# Patient Record
Sex: MALE | Race: BLACK OR AFRICAN AMERICAN | NOT HISPANIC OR LATINO | Employment: OTHER | URBAN - METROPOLITAN AREA
[De-identification: names, ages, dates, MRNs, and addresses within clinical notes are randomized per-mention and may not be internally consistent; named-entity substitution may affect disease eponyms.]

---

## 2023-09-02 LAB — PROSTATE SPECIFIC AG (NG/ML) IN SER/PLAS: 0.98 NG/ML (ref 0–4)

## 2023-09-25 PROBLEM — N40.2 PROSTATE NODULE: Status: ACTIVE | Noted: 2023-09-25

## 2023-09-25 PROBLEM — H40.1121 PRIMARY OPEN ANGLE GLAUCOMA OF LEFT EYE, MILD STAGE: Status: ACTIVE | Noted: 2023-09-25

## 2023-09-25 PROBLEM — H40.003 GLAUCOMA SUSPECT OF BOTH EYES: Status: ACTIVE | Noted: 2023-09-25

## 2023-09-25 PROBLEM — R00.2 PALPITATIONS: Status: ACTIVE | Noted: 2023-09-25

## 2023-09-25 PROBLEM — I10 HYPERTENSION: Status: ACTIVE | Noted: 2023-09-25

## 2023-09-25 PROBLEM — R73.01 IMPAIRED FASTING GLUCOSE: Status: ACTIVE | Noted: 2023-09-25

## 2023-09-25 PROBLEM — H25.011 CORTICAL AGE-RELATED CATARACT OF RIGHT EYE: Status: ACTIVE | Noted: 2023-09-25

## 2023-09-25 PROBLEM — H40.1111 PRIMARY OPEN ANGLE GLAUCOMA OF RIGHT EYE, MILD STAGE: Status: ACTIVE | Noted: 2023-09-25

## 2023-09-25 PROBLEM — I45.10 RIGHT BUNDLE BRANCH BLOCK (RBBB): Status: ACTIVE | Noted: 2023-09-25

## 2023-09-25 PROBLEM — N40.1 BPH WITH OBSTRUCTION/LOWER URINARY TRACT SYMPTOMS: Status: ACTIVE | Noted: 2023-09-25

## 2023-09-25 PROBLEM — R35.0 URINARY FREQUENCY: Status: ACTIVE | Noted: 2023-09-25

## 2023-09-25 PROBLEM — H01.003 BLEPHARITIS OF BOTH EYES: Status: ACTIVE | Noted: 2023-09-25

## 2023-09-25 PROBLEM — R07.9 CHEST PAIN: Status: ACTIVE | Noted: 2023-09-25

## 2023-09-25 PROBLEM — H01.006 BLEPHARITIS OF BOTH EYES: Status: ACTIVE | Noted: 2023-09-25

## 2023-09-25 PROBLEM — N13.8 BPH WITH OBSTRUCTION/LOWER URINARY TRACT SYMPTOMS: Status: ACTIVE | Noted: 2023-09-25

## 2023-09-25 PROBLEM — H25.012 CORTICAL AGE-RELATED CATARACT OF LEFT EYE: Status: ACTIVE | Noted: 2023-09-25

## 2023-09-25 PROBLEM — E78.5 DYSLIPIDEMIA: Status: ACTIVE | Noted: 2023-09-25

## 2023-09-25 RX ORDER — HYDROCHLOROTHIAZIDE 12.5 MG/1
1 TABLET ORAL DAILY
COMMUNITY
Start: 2019-03-07 | End: 2024-05-20 | Stop reason: ALTCHOICE

## 2023-09-25 RX ORDER — LOSARTAN POTASSIUM 100 MG/1
1 TABLET ORAL DAILY
COMMUNITY
Start: 2021-07-30

## 2023-09-25 RX ORDER — HYDROGEN PEROXIDE 3 %
1 SOLUTION, NON-ORAL MISCELLANEOUS DAILY PRN
COMMUNITY
Start: 2018-04-06

## 2023-09-25 RX ORDER — METOPROLOL TARTRATE 25 MG/1
1 TABLET, FILM COATED ORAL 2 TIMES DAILY
COMMUNITY
Start: 2015-01-26 | End: 2024-05-20 | Stop reason: ALTCHOICE

## 2023-09-25 RX ORDER — VIT C/E/ZN/COPPR/LUTEIN/ZEAXAN 250MG-90MG
1 CAPSULE ORAL DAILY
COMMUNITY
Start: 2018-04-06

## 2023-09-25 RX ORDER — NITROGLYCERIN 0.4 MG/1
1 TABLET SUBLINGUAL AS NEEDED
COMMUNITY
Start: 2019-10-16

## 2023-09-25 RX ORDER — NAPROXEN SODIUM 220 MG/1
1 TABLET, FILM COATED ORAL DAILY
COMMUNITY
End: 2024-05-20 | Stop reason: ALTCHOICE

## 2023-09-25 RX ORDER — FLAXSEED OIL 1000 MG
1 CAPSULE ORAL DAILY
COMMUNITY
Start: 2021-08-17

## 2023-09-25 RX ORDER — TAMSULOSIN HYDROCHLORIDE 0.4 MG/1
CAPSULE ORAL SEE ADMIN INSTRUCTIONS
COMMUNITY
Start: 2015-01-26

## 2023-09-25 RX ORDER — ATORVASTATIN CALCIUM 40 MG/1
1 TABLET, FILM COATED ORAL DAILY
COMMUNITY
Start: 2018-04-06

## 2023-11-07 ENCOUNTER — APPOINTMENT (OUTPATIENT)
Dept: OPHTHALMOLOGY | Facility: CLINIC | Age: 83
End: 2023-11-07
Payer: MEDICARE

## 2024-01-23 ENCOUNTER — OFFICE VISIT (OUTPATIENT)
Dept: OPHTHALMOLOGY | Facility: CLINIC | Age: 84
End: 2024-01-23
Payer: MEDICARE

## 2024-01-23 DIAGNOSIS — H53.40 UNSPECIFIED VISUAL FIELD DEFECTS: ICD-10-CM

## 2024-01-23 DIAGNOSIS — H40.009 GLAUCOMA SUSPECT, UNSPECIFIED LATERALITY: Primary | ICD-10-CM

## 2024-01-23 PROCEDURE — 92133 CPTRZD OPH DX IMG PST SGM ON: CPT | Performed by: OPHTHALMOLOGY

## 2024-01-23 PROCEDURE — 99212 OFFICE O/P EST SF 10 MIN: CPT | Performed by: OPHTHALMOLOGY

## 2024-01-23 RX ORDER — CALCIUM CARBONATE 200(500)MG
500 TABLET,CHEWABLE ORAL
COMMUNITY
Start: 2023-10-23

## 2024-01-23 RX ORDER — AMLODIPINE BESYLATE 5 MG/1
5 TABLET ORAL DAILY
COMMUNITY

## 2024-01-23 RX ORDER — METOPROLOL SUCCINATE 50 MG/1
TABLET, EXTENDED RELEASE ORAL DAILY
COMMUNITY
Start: 2024-01-09

## 2024-01-23 RX ORDER — MULTIVITAMIN
1 TABLET ORAL
COMMUNITY
Start: 2017-10-03

## 2024-01-23 ASSESSMENT — CONF VISUAL FIELD
OS_NORMAL: 1
OD_SUPERIOR_NASAL_RESTRICTION: 0
OD_SUPERIOR_TEMPORAL_RESTRICTION: 0
OS_INFERIOR_TEMPORAL_RESTRICTION: 0
OS_SUPERIOR_TEMPORAL_RESTRICTION: 0
OS_INFERIOR_NASAL_RESTRICTION: 0
OS_SUPERIOR_NASAL_RESTRICTION: 0
OD_NORMAL: 1
OD_INFERIOR_NASAL_RESTRICTION: 0
OD_INFERIOR_TEMPORAL_RESTRICTION: 0

## 2024-01-23 ASSESSMENT — EXTERNAL EXAM - LEFT EYE: OS_EXAM: NORMAL

## 2024-01-23 ASSESSMENT — VISUAL ACUITY
OS_CC: 20/20
METHOD: SNELLEN - LINEAR
OD_CC: 20/20-1
CORRECTION_TYPE: GLASSES

## 2024-01-23 ASSESSMENT — PACHYMETRY
OS_CT(UM): 535
OD_CT(UM): 540

## 2024-01-23 ASSESSMENT — SLIT LAMP EXAM - LIDS
COMMENTS: GOOD POSITION
COMMENTS: GOOD POSITION

## 2024-01-23 ASSESSMENT — TONOMETRY
OD_IOP_MMHG: 18
IOP_METHOD: GOLDMANN APPLANATION
OS_IOP_MMHG: 16

## 2024-01-23 ASSESSMENT — CUP TO DISC RATIO
OS_RATIO: .65
OD_RATIO: .45

## 2024-01-23 ASSESSMENT — EXTERNAL EXAM - RIGHT EYE: OD_EXAM: NORMAL

## 2024-05-17 PROBLEM — E21.0 PRIMARY HYPERPARATHYROIDISM (MULTI): Status: ACTIVE | Noted: 2023-05-16

## 2024-05-17 PROBLEM — I10 ESSENTIAL HYPERTENSION: Status: ACTIVE | Noted: 2017-10-03

## 2024-05-17 PROBLEM — I25.10 CORONARY ARTERY DISEASE INVOLVING NATIVE CORONARY ARTERY OF NATIVE HEART WITHOUT ANGINA PECTORIS: Status: ACTIVE | Noted: 2019-08-20

## 2024-05-17 PROBLEM — H25.10 NUCLEAR SENILE CATARACT: Status: ACTIVE | Noted: 2024-05-17

## 2024-05-19 NOTE — PROGRESS NOTES
Primary Care Physician: Lissa Lincoln MD  Date of Visit: 05/20/2024  8:20 AM EDT  [place] [facility] [place] [address]   Last office visit: December 23, 2021    Chief Complaint:     Palpitations    HPI/Summary  Vincent Tracey is a 83 y.o. male who presents for followup cardiology evaluation.     The last visit was 2-1/2 years ago.  He presents with hypertension and dyslipidemia.  In 2019, an echocardiogram was normal.  He reported palpitations in 2019, and we placed a Zio patch monitor which showed no arrhythmia.  Previous cardiac testing showed no evidence for significant coronary artery disease.  A stress test on December 30, 2019, which was a pharmacologic stress myocardial perfusion scan, showed diaphragmatic attenuation artifact.  The ejection fraction was 61%.  At his last visit, he reported an episode of probable PAT, lasting for 15 to 20 minutes.  He described the sudden onset of a rapid and regular tachycardia.  No other diagnostic testing was performed at that time.  The patient is now followed by physicians at [facility].  In 2023, he underwent parathyroidectomy.    The patient reports sudden onset of rapid regular tachycardia, associated with lightheadedness if he is standing.  He sits down, the symptoms last for 5 to 15 minutes and resolved spontaneously.  The frequency is less than once monthly.  No associated chest pain or dyspnea.  No clear-cut precipitating factors.  No syncope.  Similar symptoms were reported at his last visit.  Otherwise, he feels well.  He walks for exercise, 1 mile in 25 minutes, 6 days weekly.  No exertional dyspnea or angina.    We note prediabetes with a hemoglobin A1c 6.3%.  Renal function normal.  A lipid panel just performed on March 5, 2024 shows a cholesterol 135, HDL 45, LDL 78, triglycerides 59 mg/dL.    The patient is being treated with aspirin 81 mg daily, amlodipine 5 mg daily, atorvastatin 40 mg daily, losartan 100 mg daily and metoprolol succinate 50 mg once daily.  He is  "followed by primary care at [facility].   Specialty Problems          Cardiology Problems    Hyperlipidemia     Last Assessment & Plan:    Formatting of this note might be different from the original.   Assessment:   On lipitor         Essential hypertension     Last Assessment & Plan:    Formatting of this note might be different from the original.   Assessment: BP today 142/69   On Amlodipine and Metoprolol         Coronary artery disease involving native coronary artery of native heart without angina pectoris     Last Assessment & Plan:    Formatting of this note might be different from the original.   Assessment: has followed with Dr. Bueno   No history of stenting or CABG   Had cardiac cath many years ago   No chest pain or SOB   No need to use nitro since seeing Dr. Bueno   Last visit with Dr. Bueno 12/2021 - pt reports he was told no need to follow up unless he has an issue   Aspirin 81 mg is already on hold since Sunday for surgery   On statin   Pt reports has been told only mild CAD in past      Most recent visit with Dr. Bueno 12/23/2021 (found in care everywhere):   \"Impressions      The patient may have had an episode of paroxysmal SVT. He does not have any evidence for underlying coronary artery disease. Atrial fibrillation is a possibility. He has had no recurrent arrhythmia. Nonetheless he has repeated episodes, the likelihood of detecting any significant arrhythmia on a continuous ECG monitor would be quite low. He has symptoms recur, we will place a 30-day event monitor. He is already on a reasonable dose of beta-blocker with a low resting heart rate, and his blood pressures at home are well controlled.       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    Dry eye syndrome of right lacrimal gland 08/11/2015    Dry eye syndrome of right lacrimal gland    Other specified health status     No pertinent past surgical history    Pain in left hip 06/19/2017    Left hip pain    Personal history of other diseases of the circulatory system     History of hypertension    Personal history of other diseases of the respiratory system 06/02/2015    History of sinusitis    Personal history of other infectious and parasitic diseases 06/02/2015    History of tinea corporis    Strain of muscle, fascia and tendon of the posterior muscle group at thigh level, left thigh, initial encounter 06/19/2017    Left hamstring muscle strain, initial encounter          History reviewed. No pertinent surgical history.         Social History     Tobacco Use    Smoking status: Never    Smokeless tobacco: Never        Physical Activity: Insufficiently Active (2/7/2023)    Received from [facility]    Exercise Vital Sign     Days of Exercise per Week: 1 day     Minutes of Exercise per Session: 30 min            Allergies   Allergen Reactions    Ace Inhibitors Cough    Amoxicillin Swelling         Current Outpatient Medications   Medication Instructions    amLODIPine (NORVASC) 5 mg, oral, Daily    atorvastatin (Lipitor) 40 mg tablet 1 tablet, oral, Daily    calcium carbonate (TUMS) 500 mg, oral, Every 1 hour PRN    cetirizine (ZyrTEC) 10 mg capsule 1 capsule, oral, Daily PRN    cholecalciferol (Vitamin D-3) 25 MCG (1000 UT) capsule 1 capsule, oral, Daily    esomeprazole (NexIUM) 20 mg DR capsule 1 capsule, oral, Daily PRN    flaxseed oiL 1,000 mg capsule 1 capsule, oral, Daily    losartan (Cozaar) 100 mg tablet 1 tablet, oral, Daily     "metoprolol succinate XL (Toprol-XL) 50 mg 24 hr tablet oral, Daily    multivitamin tablet 1 tablet, oral, Daily RT    nitroglycerin (Nitrostat) 0.4 mg SL tablet 1 tablet, sublingual, As needed    tamsulosin (Flomax) 0.4 mg 24 hr capsule oral, See admin instructions, TAKE 2 CAPSULE Daily 1/2 hour after same meal or at bedtime with snack       ROS     Vital Signs:  Vitals:    05/20/24 0814   BP: 128/61   BP Location: Left arm   Patient Position: Sitting   BP Cuff Size: Large adult   Pulse: 72   SpO2: 94%   Weight: 92.1 kg (203 lb 2 oz)   Height: 1.753 m (5' 9\")     Wt Readings from Last 2 Encounters:   05/20/24 92.1 kg (203 lb 2 oz)   09/02/22 91.7 kg (202 lb 2 oz)     Body mass index is 30 kg/m².       Physical Exam:    He appeared well.  He was pleasant and cooperative, not in any pain.  The neck veins were not apparent.  There were no bruits.  The lung fields were clear.  Heart sounds regular, no murmurs appreciated.  Mild abdominal obesity.  No edema noted.  Otherwise, examination normal.     Last Labs:  CMP:  Recent Labs     02/14/23  1017 08/23/22  1500 08/15/22  0717 11/03/21  1307 08/21/21  0900    139 138 141 137   K 4.5 4.3 4.4 4.4 4.4    106 104 104 103   CO2 30 27 29 30 27   ANIONGAP 11 10 9* 11 11   BUN 19 18 20 22 20   CREATININE 1.14 1.08 1.17 0.99 1.08   GLUCOSE 114* 107* 110* 97 106*     Recent Labs     02/14/23  1017 08/15/22  0717 11/03/21  1307 08/21/21  0900 01/22/21  0930   ALBUMIN 4.5  --  4.5 4.2 4.6   ALKPHOS 78  --  91 81 98   ALT 22 19 23 20 29   AST 19  --  22 22 22   BILITOT 0.6  --  0.7 0.6 0.5     CBC:  Recent Labs     02/14/23  1017 08/15/22  0717 11/03/21  1307 08/21/21  0900 01/22/21  0930   WBC 4.8 5.2 5.3 5.3 4.4   HGB 12.4* 12.1* 12.2* 12.7* 13.5   HCT 35.0* 34.7* 36.6* 35.2* 38.8*    221 223 215 224   MCV 80 81 80 78* 80     COAG:   Recent Labs     12/04/19  1120   INR 1.0     HEME/ENDO:  Recent Labs     03/05/24  0923 08/30/23  0942 02/14/23  1017 " "08/15/22  0717 08/20/19  0936 03/12/19  0952 02/12/19  0927 01/29/19  0840 08/07/18  0957 02/01/18  0000 02/01/18  0000 10/04/17  0905   FERRITIN  --   --  673*  --   --   --   --   --  390*  --  422* 467*   IRONSAT  --   --  31  --   --   --   --   --   --   --  30 13*   TSH  --   --   --   --   --  0.83 0.75  --   --   --   --   --    HGBA1C 6.3* 5.8* 6.5* 6.1*   < >  --   --    < > 6.1   < > 5.9  --     < > = values in this interval not displayed.      CARDIAC: No results for input(s): \"LDH\", \"CKMB\", \"TROPHS\", \"BNP\" in the last 48674 hours.    No lab exists for component: \"CK\", \"CKMBP\"  Recent Labs     02/14/23  1017 08/15/22  0717 11/03/21  1307   CHOL 158 143 146   LDLF 93 81 79   HDL 51.0 46.2 48.4   TRIG 70 80 92       Last Cardiology Tests:    ECG:    Sinus rhythm with first-degree AV block and right bundle branch block.    Echo:  Echo Results:  No results found for this or any previous visit from the past 3650 days.       Cath:      Stress Test:  Stress Results:  No results found for this or any previous visit from the past 365 days.         Cardiac Imaging:        Assessment/Plan     The patient's symptoms are entirely consistent with paroxysmal SVT.  We discussed the Valsalva maneuver, and we have provided patient education material.  Blood pressures are well-controlled, and his lipids are at goal.  We reviewed his medication regimen.  Overall, the risks of daily aspirin outweigh the benefits in this patient, greater than 80 years old without a prior stent or cardiac event.  We did not initiate any additional medication at today's visit and we do not recommend further testing as yet.  We reviewed prior testing with the patient and with his wife.  We encouraged the patient to continue exercising and to restrict calories to achieve modest weight loss in the setting of prediabetes.  He was enthusiastic about making some lifestyle changes.  He will be monitored by his primary care physician, and can follow-up " in cardiology clinic in 1 year, or as needed.      Orders:  Orders Placed This Encounter   Procedures    ECG 12 lead (Clinic Performed)      Followup Appts:  Future Appointments   Date Time Provider Department Center   7/30/2024 10:30 AM MD Cody [id number] [facility]   9/5/2024  2:00 PM Lennox Ferrer MD [facility]           ____________________________________________________________  Jose Maria Bueno MD    Senior Attending Physician  [facility]  [facility]    [facility] Chair for Cardiovascular Excellence  [employer]

## 2024-05-20 ENCOUNTER — OFFICE VISIT (OUTPATIENT)
Dept: CARDIOLOGY | Facility: CLINIC | Age: 84
End: 2024-05-20
Payer: MEDICARE

## 2024-05-20 VITALS
HEIGHT: 69 IN | OXYGEN SATURATION: 94 % | HEART RATE: 72 BPM | WEIGHT: 203.13 LBS | BODY MASS INDEX: 30.09 KG/M2 | DIASTOLIC BLOOD PRESSURE: 61 MMHG | SYSTOLIC BLOOD PRESSURE: 128 MMHG

## 2024-05-20 DIAGNOSIS — E78.5 DYSLIPIDEMIA: ICD-10-CM

## 2024-05-20 DIAGNOSIS — I45.10 RIGHT BUNDLE BRANCH BLOCK (RBBB): ICD-10-CM

## 2024-05-20 DIAGNOSIS — I10 ESSENTIAL HYPERTENSION: ICD-10-CM

## 2024-05-20 DIAGNOSIS — I47.10 PAROXYSMAL SUPRAVENTRICULAR TACHYCARDIA (CMS-HCC): Primary | ICD-10-CM

## 2024-05-20 PROCEDURE — 1126F AMNT PAIN NOTED NONE PRSNT: CPT | Performed by: INTERNAL MEDICINE

## 2024-05-20 PROCEDURE — 3074F SYST BP LT 130 MM HG: CPT | Performed by: INTERNAL MEDICINE

## 2024-05-20 PROCEDURE — 93005 ELECTROCARDIOGRAM TRACING: CPT | Performed by: INTERNAL MEDICINE

## 2024-05-20 PROCEDURE — G2211 COMPLEX E/M VISIT ADD ON: HCPCS | Performed by: INTERNAL MEDICINE

## 2024-05-20 PROCEDURE — 99214 OFFICE O/P EST MOD 30 MIN: CPT | Performed by: INTERNAL MEDICINE

## 2024-05-20 PROCEDURE — 3078F DIAST BP <80 MM HG: CPT | Performed by: INTERNAL MEDICINE

## 2024-05-20 PROCEDURE — 1159F MED LIST DOCD IN RCRD: CPT | Performed by: INTERNAL MEDICINE

## 2024-05-20 PROCEDURE — 1036F TOBACCO NON-USER: CPT | Performed by: INTERNAL MEDICINE

## 2024-05-20 PROCEDURE — 1160F RVW MEDS BY RX/DR IN RCRD: CPT | Performed by: INTERNAL MEDICINE

## 2024-05-20 PROCEDURE — 93010 ELECTROCARDIOGRAM REPORT: CPT | Performed by: INTERNAL MEDICINE

## 2024-05-20 ASSESSMENT — COLUMBIA-SUICIDE SEVERITY RATING SCALE - C-SSRS
1. IN THE PAST MONTH, HAVE YOU WISHED YOU WERE DEAD OR WISHED YOU COULD GO TO SLEEP AND NOT WAKE UP?: NO
2. HAVE YOU ACTUALLY HAD ANY THOUGHTS OF KILLING YOURSELF?: NO
6. HAVE YOU EVER DONE ANYTHING, STARTED TO DO ANYTHING, OR PREPARED TO DO ANYTHING TO END YOUR LIFE?: NO

## 2024-05-20 ASSESSMENT — ENCOUNTER SYMPTOMS
DEPRESSION: 0
LOSS OF SENSATION IN FEET: 0
OCCASIONAL FEELINGS OF UNSTEADINESS: 0

## 2024-05-20 ASSESSMENT — PATIENT HEALTH QUESTIONNAIRE - PHQ9
1. LITTLE INTEREST OR PLEASURE IN DOING THINGS: NOT AT ALL
SUM OF ALL RESPONSES TO PHQ9 QUESTIONS 1 AND 2: 0
2. FEELING DOWN, DEPRESSED OR HOPELESS: NOT AT ALL

## 2024-05-20 ASSESSMENT — PAIN SCALES - GENERAL: PAINLEVEL: 0-NO PAIN

## 2024-05-20 NOTE — PATIENT INSTRUCTIONS
You are doing well.  We have provided material on supraventricular tachycardia, for your study.  We encourage you to exercise for at least 30 minutes 6 days weekly and try to restrict carbohydrates to achieve modest weight loss.  You do have prediabetes.  No need to continue daily aspirin, since we think that the overall risks outweigh the benefits in your age group.  You can send us a [employer] message with any questions or concerns.  Otherwise, we would be pleased to see you in 1 year, or as questions arise.

## 2024-06-05 LAB
ATRIAL RATE: 73 BPM
P AXIS: 76 DEGREES
P OFFSET: 164 MS
P ONSET: 108 MS
PR INTERVAL: 218 MS
Q ONSET: 217 MS
QRS COUNT: 12 BEATS
QRS DURATION: 142 MS
QT INTERVAL: 404 MS
QTC CALCULATION(BAZETT): 445 MS
QTC FREDERICIA: 431 MS
R AXIS: 47 DEGREES
T AXIS: 66 DEGREES
T OFFSET: 419 MS
VENTRICULAR RATE: 73 BPM

## 2024-07-30 ENCOUNTER — APPOINTMENT (OUTPATIENT)
Dept: OPHTHALMOLOGY | Facility: CLINIC | Age: 84
End: 2024-07-30
Payer: MEDICARE

## 2024-07-30 DIAGNOSIS — H40.10X1 OPEN-ANGLE GLAUCOMA OF BOTH EYES, MILD STAGE, UNSPECIFIED OPEN-ANGLE GLAUCOMA TYPE: Primary | ICD-10-CM

## 2024-07-30 PROCEDURE — 99214 OFFICE O/P EST MOD 30 MIN: CPT | Performed by: OPHTHALMOLOGY

## 2024-07-30 ASSESSMENT — TONOMETRY
OD_IOP_MMHG: 18
OS_IOP_MMHG: 16
IOP_METHOD: GOLDMANN APPLANATION

## 2024-07-30 ASSESSMENT — VISUAL ACUITY
METHOD: SNELLEN - LINEAR
CORRECTION_TYPE: GLASSES
OD_CC: 20/25+1
OS_CC: 20/25+1

## 2024-07-30 ASSESSMENT — SLIT LAMP EXAM - LIDS
COMMENTS: GOOD POSITION
COMMENTS: GOOD POSITION

## 2024-07-30 ASSESSMENT — EXTERNAL EXAM - RIGHT EYE: OD_EXAM: NORMAL

## 2024-07-30 ASSESSMENT — PACHYMETRY
OD_CT(UM): 540
OS_CT(UM): 535

## 2024-07-30 ASSESSMENT — CUP TO DISC RATIO
OS_RATIO: .65
OD_RATIO: .45

## 2024-07-30 ASSESSMENT — EXTERNAL EXAM - LEFT EYE: OS_EXAM: NORMAL

## 2024-09-03 NOTE — PROGRESS NOTES
[facility]    Last visit - 9/7/23     HISTORY OF PRESENT ILLNESS:   Vincent Tracey is a 84 y.o. male who is being seen today for follow up and to review PSA results.    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    Dry eye syndrome of left lacrimal gland    Dry eye syndrome of right lacrimal gland 08/11/2015    Dry eye syndrome of right lacrimal gland    Glaucoma     Other specified health status     No pertinent past surgical history    Pain in left hip 06/19/2017    Left hip pain    Personal history of other diseases of the circulatory system     History of hypertension    Personal history of other diseases of the respiratory system 06/02/2015    History of sinusitis    Personal history of other infectious and parasitic diseases 06/02/2015    History of tinea corporis    Strain of muscle, fascia and tendon of the posterior muscle group at thigh level, left thigh, initial encounter 06/19/2017    Left hamstring muscle strain, initial encounter   - BPH w/ LUTS, on Tamsulosin 0.8 mg, prostate nodule, and PI-RADS 2 on MRI 05/02/19 with negative prostate biopsy on 01/16/2020.     PAST SURGICAL HISTORY:  No past surgical history on file.     ALLERGIES:   Allergies   Allergen Reactions    Ace Inhibitors Cough    Amoxicillin Swelling        MEDICATIONS:   Current Outpatient Medications   Medication Instructions    amLODIPine (NORVASC) 5 mg, oral, Daily    atorvastatin (Lipitor) 40 mg tablet 1 tablet, oral, Daily    calcium carbonate (TUMS) 500 mg, oral, Every 1 hour PRN    cetirizine (ZyrTEC) 10 mg capsule 1 capsule, oral, Daily PRN    cholecalciferol (Vitamin D-3) 25 MCG (1000 UT) capsule 1 capsule, oral, Daily    esomeprazole (NexIUM) 20 mg DR capsule 1 capsule, oral, Daily PRN    flaxseed oiL 1,000 mg capsule 1 capsule,  "oral, Daily    losartan (Cozaar) 100 mg tablet 1 tablet, oral, Daily    metoprolol succinate XL (Toprol-XL) 50 mg 24 hr tablet oral, Daily    multivitamin tablet 1 tablet, oral, Daily RT    nitroglycerin (Nitrostat) 0.4 mg SL tablet 1 tablet, sublingual, As needed    tamsulosin (Flomax) 0.4 mg 24 hr capsule oral, See admin instructions, TAKE 2 CAPSULE Daily 1/2 hour after same meal or at bedtime with snack        PHYSICAL EXAM:  There were no vitals taken for this visit.  Constitutional: Patient appears well-developed and well-nourished. No distress.    Pulmonary/Chest: Effort normal. No respiratory distress.   Abdominal: Soft, ND NT  : WNL  Musculoskeletal: Normal range of motion.    Neurological: Alert and oriented to person, place, and time.  Psychiatric: Normal mood and affect. Behavior is normal. Thought content normal.      Labs:  No results found for: \"TESTOSTERONE\"  Lab Results   Component Value Date    PSA 0.98 09/02/2023     No components found for: \"CBC\"  Lab Results   Component Value Date    CREATININE 1.14 02/14/2023     No components found for: \"TESTOTMS\"  No results found for: \"TESTF\"    Discussion:  Doing well, no complaints. Denies any dysuria, hematuria, bothersome urinary frequency, urgency, or flank pain. Patient denies any pushing or straining to urinate. He has some NTF but not bothersome. Will continue to monitor.    PVR 66 cc    Assessment:      1. BPH with obstruction/lower urinary tract symptoms  Measure post void residual          Plan:   Follow up in 1 year or sooner as needed.  All questions and concerns were addressed. Patient verbalizes understanding and has no other questions at this time.      Scribe Attestation  By signing my name below, I, Elaina Calderon   attest that this documentation has been prepared under the direction and in the presence of Lennox Ferrer MD.   "

## 2024-09-05 ENCOUNTER — OFFICE VISIT (OUTPATIENT)
Dept: UROLOGY | Facility: HOSPITAL | Age: 84
End: 2024-09-05
Payer: MEDICARE

## 2024-09-05 DIAGNOSIS — N40.1 BPH WITH OBSTRUCTION/LOWER URINARY TRACT SYMPTOMS: Primary | ICD-10-CM

## 2024-09-05 DIAGNOSIS — N13.8 BPH WITH OBSTRUCTION/LOWER URINARY TRACT SYMPTOMS: Primary | ICD-10-CM

## 2024-09-05 PROCEDURE — 51798 US URINE CAPACITY MEASURE: CPT | Performed by: UROLOGY

## 2024-09-05 PROCEDURE — 99213 OFFICE O/P EST LOW 20 MIN: CPT | Performed by: UROLOGY

## 2024-09-05 PROCEDURE — G2211 COMPLEX E/M VISIT ADD ON: HCPCS | Performed by: UROLOGY

## 2024-09-06 ENCOUNTER — APPOINTMENT (OUTPATIENT)
Dept: UROLOGY | Facility: HOSPITAL | Age: 84
End: 2024-09-06
Payer: MEDICARE

## 2024-12-11 ENCOUNTER — TELEPHONE (OUTPATIENT)
Dept: SCHEDULING | Age: 84
End: 2024-12-11
Payer: MEDICARE

## 2024-12-20 ENCOUNTER — OFFICE VISIT (OUTPATIENT)
Dept: CARDIOLOGY | Facility: CLINIC | Age: 84
End: 2024-12-20
Payer: MEDICARE

## 2024-12-20 VITALS
HEART RATE: 70 BPM | SYSTOLIC BLOOD PRESSURE: 162 MMHG | HEIGHT: 69 IN | OXYGEN SATURATION: 95 % | WEIGHT: 195.1 LBS | BODY MASS INDEX: 28.9 KG/M2 | DIASTOLIC BLOOD PRESSURE: 81 MMHG

## 2024-12-20 DIAGNOSIS — I47.10 SVT (SUPRAVENTRICULAR TACHYCARDIA) (CMS-HCC): Primary | ICD-10-CM

## 2024-12-20 DIAGNOSIS — I10 ESSENTIAL HYPERTENSION: ICD-10-CM

## 2024-12-20 DIAGNOSIS — I45.10 RIGHT BUNDLE BRANCH BLOCK (RBBB): ICD-10-CM

## 2024-12-20 PROCEDURE — 1036F TOBACCO NON-USER: CPT | Performed by: INTERNAL MEDICINE

## 2024-12-20 PROCEDURE — 99214 OFFICE O/P EST MOD 30 MIN: CPT | Performed by: INTERNAL MEDICINE

## 2024-12-20 PROCEDURE — 1159F MED LIST DOCD IN RCRD: CPT | Performed by: INTERNAL MEDICINE

## 2024-12-20 PROCEDURE — 93005 ELECTROCARDIOGRAM TRACING: CPT | Performed by: INTERNAL MEDICINE

## 2024-12-20 PROCEDURE — 3077F SYST BP >= 140 MM HG: CPT | Performed by: INTERNAL MEDICINE

## 2024-12-20 PROCEDURE — 3079F DIAST BP 80-89 MM HG: CPT | Performed by: INTERNAL MEDICINE

## 2024-12-20 PROCEDURE — 1126F AMNT PAIN NOTED NONE PRSNT: CPT | Performed by: INTERNAL MEDICINE

## 2024-12-20 ASSESSMENT — COLUMBIA-SUICIDE SEVERITY RATING SCALE - C-SSRS
1. IN THE PAST MONTH, HAVE YOU WISHED YOU WERE DEAD OR WISHED YOU COULD GO TO SLEEP AND NOT WAKE UP?: NO
6. HAVE YOU EVER DONE ANYTHING, STARTED TO DO ANYTHING, OR PREPARED TO DO ANYTHING TO END YOUR LIFE?: NO
2. HAVE YOU ACTUALLY HAD ANY THOUGHTS OF KILLING YOURSELF?: NO

## 2024-12-20 ASSESSMENT — PATIENT HEALTH QUESTIONNAIRE - PHQ9
SUM OF ALL RESPONSES TO PHQ9 QUESTIONS 1 AND 2: 0
2. FEELING DOWN, DEPRESSED OR HOPELESS: NOT AT ALL
1. LITTLE INTEREST OR PLEASURE IN DOING THINGS: NOT AT ALL

## 2024-12-20 ASSESSMENT — PAIN SCALES - GENERAL: PAINLEVEL_OUTOF10: 0-NO PAIN

## 2024-12-20 NOTE — PROGRESS NOTES
Primary Care Physician: Lissa Lincoln MD  Date of Visit: 12/20/2024  8:20 AM EST  [place] [facility] [place] [address]   Last office visit: 5/20/2024     Chief Complaint:     6-month follow-up evaluation.    HPI/Summary  Vincent Tracey is a 84 y.o. male who presents for followup cardiology evaluation.     The patient presents with hypertension and dyslipidemia.  He had reported palpitations when seen in 2019, and at that time a Zio patch monitor showed no arrhythmia.  A pharmacologic trip stress myocardial perfusion scan in 2019 was normal.  When he was seen on May 20, 2024, he reported an episode of rapid regular tachycardia, probable PAT.  Otherwise, he was asymptomatic, walking a distance of 1 mile in 25 minutes, 6 days weekly.    He continues on amlodipine 5 mg daily, losartan 100 mg daily and metoprolol succinate 50 mg daily for management of hypertension and atorvastatin 40 mg daily.    Since the last visit 7 months ago, the patient has experienced 2 episodes of presumed SVT.  Rapid, regular heart rate, approximately 140/min.  Each episode was unprovoked, no precipitating factors, lasted 15 to 30 minutes, stopped spontaneously.  Tried Valsalva maneuver.  No associated chest pain but some diaphoresis.  Otherwise, he feels well.  Symptoms have never occurred with exercise or while driving a car.  No associated lightheadedness or syncope.      Specialty Problems          Cardiology Problems    Hyperlipidemia     Last Assessment & Plan:    Formatting of this note might be different from the original.   Assessment:   On lipitor         Essential hypertension     Last Assessment & Plan:    Formatting of this note might be different from the original.   Assessment: BP today 142/69   On Amlodipine and Metoprolol         Coronary artery disease involving native coronary artery of native heart without angina pectoris     Last Assessment & Plan:    Formatting of this note might be different from the original.   Assessment:  "has followed with Dr. Bueno   No history of stenting or CABG   Had cardiac cath many years ago   No chest pain or SOB   No need to use nitro since seeing Dr. Bueno   Last visit with Dr. Bueno 12/2021 - pt reports he was told no need to follow up unless he has an issue   Aspirin 81 mg is already on hold since Sunday for surgery   On statin   Pt reports has been told only mild CAD in past      Most recent visit with Dr. Bueno 12/23/2021 (found in care everywhere):   \"Impressions      The patient may have had an episode of paroxysmal SVT. He does not have any evidence for underlying coronary artery disease. Atrial fibrillation is a possibility. He has had no recurrent arrhythmia. Nonetheless he has repeated episodes, the likelihood of detecting any significant arrhythmia on a continuous ECG monitor would be quite low. He has symptoms recur, we will place a 30-day event monitor. He is already on a reasonable dose of beta-blocker with a low resting heart rate, and his blood pressures at home are well controlled.       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    Dry eye syndrome of right lacrimal gland 08/11/2015    Dry eye syndrome of right lacrimal gland    Glaucoma     Other specified health status     No pertinent past surgical history    Pain in left hip 06/19/2017    Left hip pain    Personal history of other diseases of the circulatory system     History of hypertension    Personal history of other " "diseases of the respiratory system 06/02/2015    History of sinusitis    Personal history of other infectious and parasitic diseases 06/02/2015    History of tinea corporis    Strain of muscle, fascia and tendon of the posterior muscle group at thigh level, left thigh, initial encounter 06/19/2017    Left hamstring muscle strain, initial encounter          No past surgical history on file.         Social History     Tobacco Use    Smoking status: Never    Smokeless tobacco: Never   Substance Use Topics    Alcohol use: Not Currently     Comment: occasionally                 Allergies   Allergen Reactions    Ace Inhibitors Cough    Amoxicillin Swelling         Current Outpatient Medications   Medication Instructions    amLODIPine (NORVASC) 5 mg, Daily    atorvastatin (Lipitor) 40 mg tablet 1 tablet, Daily    calcium carbonate (TUMS) 500 mg, Every 1 hour PRN    cetirizine (ZyrTEC) 10 mg capsule 1 capsule, Daily PRN    cholecalciferol (Vitamin D-3) 25 MCG (1000 UT) capsule 1 capsule, Daily    esomeprazole (NexIUM) 20 mg DR capsule 1 capsule, Daily PRN    flaxseed oiL 1,000 mg capsule 1 capsule, Daily    losartan (Cozaar) 100 mg tablet 1 tablet, Daily    metoprolol succinate XL (Toprol-XL) 50 mg 24 hr tablet Daily    multivitamin tablet 1 tablet, Daily RT    nitroglycerin (Nitrostat) 0.4 mg SL tablet 1 tablet, As needed    tamsulosin (Flomax) 0.4 mg 24 hr capsule See admin instructions       ROS     Vital Signs:  Vitals:    12/20/24 0812   BP: 162/81   BP Location: Left arm   Patient Position: Sitting   BP Cuff Size: Adult   Pulse: 70   SpO2: 95%   Weight: 88.5 kg (195 lb 1.6 oz)   Height: 1.753 m (5' 9\")     Wt Readings from Last 2 Encounters:   12/20/24 88.5 kg (195 lb 1.6 oz)   05/20/24 92.1 kg (203 lb 2 oz)     Body mass index is 28.81 kg/m².       Physical Exam:    On examination he appeared in good health and spirits. Vital signs as documented. Skin warm and dry and without overt rashes. Neck without JVD. Lungs " "clear. Heart exam notable for regular rhythm, normal sounds and grade 2 systolic ejection murmur, heard best at second right intercostal space.  No diastolic murmur.  No gallop.  Abdomen unremarkable and without evidence of organomegaly, masses, or abdominal aortic enlargement. Extremities nonedematous.     Last Labs:  CMP:  Recent Labs     02/14/23  1017 08/23/22  1500 08/15/22  0717 11/03/21  1307 08/21/21  0900    139 138 141 137   K 4.5 4.3 4.4 4.4 4.4    106 104 104 103   CO2 30 27 29 30 27   ANIONGAP 11 10 9* 11 11   BUN 19 18 20 22 20   CREATININE 1.14 1.08 1.17 0.99 1.08   GLUCOSE 114* 107* 110* 97 106*     Recent Labs     02/14/23  1017 08/15/22  0717 11/03/21  1307 08/21/21  0900 01/22/21  0930   ALBUMIN 4.5  --  4.5 4.2 4.6   ALKPHOS 78  --  91 81 98   ALT 22 19 23 20 29   AST 19  --  22 22 22   BILITOT 0.6  --  0.7 0.6 0.5     CBC:  Recent Labs     02/14/23  1017 08/15/22  0717 11/03/21  1307 08/21/21  0900 01/22/21  0930   WBC 4.8 5.2 5.3 5.3 4.4   HGB 12.4* 12.1* 12.2* 12.7* 13.5   HCT 35.0* 34.7* 36.6* 35.2* 38.8*    221 223 215 224   MCV 80 81 80 78* 80     COAG:   Recent Labs     12/04/19  1120   INR 1.0     HEME/ENDO:  Recent Labs     09/11/24  0941 03/05/24  0923 08/30/23  0942 02/14/23  1017 08/20/19  0936 03/12/19  0952 02/12/19  0927 01/29/19  0840 08/07/18  0957 02/01/18  0000 02/01/18  0000 10/04/17  0905   FERRITIN  --   --   --  673*  --   --   --   --  390*  --  422* 467*   IRONSAT  --   --   --  31  --   --   --   --   --   --  30 13*   TSH  --   --   --   --   --  0.83 0.75  --   --   --   --   --    HGBA1C 6.9* 6.3* 5.8* 6.5*   < >  --   --    < > 6.1   < > 5.9  --     < > = values in this interval not displayed.      CARDIAC: No results for input(s): \"LDH\", \"CKMB\", \"TROPHS\", \"BNP\" in the last 42860 hours.    No lab exists for component: \"CK\", \"CKMBP\"  Recent Labs     02/14/23  1017 08/15/22  0717 11/03/21  1307   CHOL 158 143 146   LDLF 93 81 79   HDL 51.0 46.2 48.4 "   TRIG 70 80 92       Last Cardiology Tests:    ECG:    Sinus bradycardia, 57/min, right bundle branch block.    Echo:  Echo Results:  No results found for this or any previous visit from the past 3650 days.       Cath:      Stress Test:  Stress Results:  No results found for this or any previous visit from the past 365 days.         Cardiac Imaging:        Assessment/Plan     The patient's symptoms are entirely consistent with paroxysmal SVT.  He has had 2 episodes in 7 months.  Each episode was self-limited, and was without other associated symptoms.  We again reviewed the Valsalva maneuver.  He is on an appropriate medication regimen.  He reports his blood pressure regularly at home, and readings are satisfactory, around 130/70 on average.  No need for any additional testing at this time.  Unlikely that we would detect an episode of SVT on a monitor.  We instructed him on the appropriate indications for emergency room or urgent care evaluation.  We discussed treatment with adenosine and we also reviewed catheter ablation of the frequency or duration of the tachycardia become more troubling.    He will continue to be seen by primary care, and we will see him on as-needed basis.      Orders:  Orders Placed This Encounter   Procedures    ECG 12 lead (Clinic Performed)      Followup Appts:  Future Appointments   Date Time Provider Department Center   3/11/2025  7:30 AM Yvan Cannon MD [id number] [facility]   9/4/2025  2:30 PM Lennox Ferrer MD [facility]           ____________________________________________________________  Jose Maria Bueno MD    Senior Attending Physician  [facility]  [facility]    [facility]  [employer]

## 2024-12-23 LAB
ATRIAL RATE: 57 BPM
P AXIS: 60 DEGREES
P OFFSET: 174 MS
P ONSET: 118 MS
PR INTERVAL: 198 MS
Q ONSET: 217 MS
QRS COUNT: 9 BEATS
QRS DURATION: 148 MS
QT INTERVAL: 438 MS
QTC CALCULATION(BAZETT): 426 MS
QTC FREDERICIA: 430 MS
R AXIS: 242 DEGREES
T AXIS: 52 DEGREES
T OFFSET: 436 MS
VENTRICULAR RATE: 57 BPM

## 2025-01-16 ENCOUNTER — APPOINTMENT (OUTPATIENT)
Dept: CARDIOLOGY | Facility: CLINIC | Age: 85
End: 2025-01-16
Payer: MEDICARE

## 2025-01-23 ENCOUNTER — APPOINTMENT (OUTPATIENT)
Dept: CARDIOLOGY | Facility: CLINIC | Age: 85
End: 2025-01-23
Payer: MEDICARE

## 2025-03-11 ENCOUNTER — APPOINTMENT (OUTPATIENT)
Dept: OPHTHALMOLOGY | Facility: CLINIC | Age: 85
End: 2025-03-11
Payer: MEDICARE

## 2025-03-11 DIAGNOSIS — H40.10X1 OPEN-ANGLE GLAUCOMA OF BOTH EYES, MILD STAGE, UNSPECIFIED OPEN-ANGLE GLAUCOMA TYPE: Primary | ICD-10-CM

## 2025-03-11 PROCEDURE — 92083 EXTENDED VISUAL FIELD XM: CPT | Performed by: OPHTHALMOLOGY

## 2025-03-11 PROCEDURE — 99214 OFFICE O/P EST MOD 30 MIN: CPT | Performed by: OPHTHALMOLOGY

## 2025-03-11 ASSESSMENT — PACHYMETRY
OS_CT(UM): 535
OD_CT(UM): 540

## 2025-03-11 ASSESSMENT — VISUAL ACUITY
OD_CC: 20/20
CORRECTION_TYPE: GLASSES
OS_CC: 20/25
METHOD: SNELLEN - LINEAR
OD_CC+: -2

## 2025-03-11 ASSESSMENT — SLIT LAMP EXAM - LIDS
COMMENTS: GOOD POSITION
COMMENTS: GOOD POSITION

## 2025-03-11 ASSESSMENT — GONIOSCOPY
OD_SUPERIOR: 3/360
OS_SUPERIOR: 3/360

## 2025-03-11 ASSESSMENT — TONOMETRY
OS_IOP_MMHG: 18
IOP_METHOD: GOLDMANN APPLANATION
OD_IOP_MMHG: 19

## 2025-03-11 ASSESSMENT — EXTERNAL EXAM - RIGHT EYE: OD_EXAM: NORMAL

## 2025-03-11 ASSESSMENT — CUP TO DISC RATIO
OD_RATIO: .45
OS_RATIO: .65

## 2025-03-11 ASSESSMENT — EXTERNAL EXAM - LEFT EYE: OS_EXAM: NORMAL
